# Patient Record
Sex: FEMALE | Race: BLACK OR AFRICAN AMERICAN | Employment: UNEMPLOYED | URBAN - METROPOLITAN AREA
[De-identification: names, ages, dates, MRNs, and addresses within clinical notes are randomized per-mention and may not be internally consistent; named-entity substitution may affect disease eponyms.]

---

## 2023-10-31 ENCOUNTER — HOSPITAL ENCOUNTER (EMERGENCY)
Facility: CLINIC | Age: 48
Discharge: HOME | End: 2023-10-31
Attending: EMERGENCY MEDICINE
Payer: COMMERCIAL

## 2023-10-31 VITALS
SYSTOLIC BLOOD PRESSURE: 152 MMHG | HEART RATE: 104 BPM | DIASTOLIC BLOOD PRESSURE: 80 MMHG | WEIGHT: 270 LBS | BODY MASS INDEX: 46.1 KG/M2 | RESPIRATION RATE: 18 BRPM | OXYGEN SATURATION: 99 % | HEIGHT: 64 IN | TEMPERATURE: 98.9 F

## 2023-10-31 DIAGNOSIS — H66.90 ACUTE OTITIS MEDIA, UNSPECIFIED OTITIS MEDIA TYPE: Primary | ICD-10-CM

## 2023-10-31 DIAGNOSIS — J06.9 UPPER RESPIRATORY TRACT INFECTION, UNSPECIFIED TYPE: ICD-10-CM

## 2023-10-31 PROCEDURE — 99283 EMERGENCY DEPT VISIT LOW MDM: CPT

## 2023-10-31 RX ORDER — AZITHROMYCIN 250 MG/1
TABLET, FILM COATED ORAL
Qty: 1 PACKET | Refills: 0 | Status: SHIPPED | OUTPATIENT
Start: 2023-10-31 | End: 2023-11-10

## 2023-10-31 RX ORDER — AZITHROMYCIN 250 MG/1
TABLET, FILM COATED ORAL
Qty: 1 PACKET | Refills: 0 | Status: SHIPPED | OUTPATIENT
Start: 2023-10-31 | End: 2023-10-31 | Stop reason: SDUPTHER

## 2023-10-31 RX ORDER — HYDROCHLOROTHIAZIDE 25 MG/1
TABLET ORAL DAILY
COMMUNITY
Start: 2017-01-28

## 2023-10-31 RX ORDER — BLOOD-GLUCOSE METER
1 KIT MISCELLANEOUS 4 TIMES DAILY PRN
Qty: 1 EACH | Refills: 0 | Status: SHIPPED | OUTPATIENT
Start: 2023-10-31

## 2023-10-31 RX ORDER — ALBUTEROL SULFATE 2.5 MG/3ML
2.5 SOLUTION RESPIRATORY (INHALATION) EVERY 6 HOURS PRN
Qty: 120 EACH | Refills: 3 | Status: SHIPPED | OUTPATIENT
Start: 2023-10-31

## 2023-10-31 RX ORDER — BENZONATATE 100 MG/1
200 CAPSULE ORAL 3 TIMES DAILY PRN
Qty: 30 CAPSULE | Refills: 0 | Status: SHIPPED | OUTPATIENT
Start: 2023-10-31 | End: 2023-11-07

## 2023-10-31 RX ORDER — ALBUTEROL SULFATE 90 UG/1
2 AEROSOL, METERED RESPIRATORY (INHALATION) 4 TIMES DAILY PRN
Qty: 18 G | Refills: 0 | Status: SHIPPED | OUTPATIENT
Start: 2023-10-31

## 2023-10-31 RX ORDER — AMLODIPINE BESYLATE 5 MG/1
1 TABLET ORAL DAILY
COMMUNITY
Start: 2017-01-28

## 2023-10-31 ASSESSMENT — ENCOUNTER SYMPTOMS
RHINORRHEA: 1
COUGH: 0
DIARRHEA: 0
VOMITING: 0
SHORTNESS OF BREATH: 0
EYE PAIN: 0
NAUSEA: 0
SORE THROAT: 1
ABDOMINAL PAIN: 0
BACK PAIN: 0

## 2023-10-31 ASSESSMENT — PAIN SCALES - GENERAL: PAINLEVEL_OUTOF10: 5

## 2023-10-31 ASSESSMENT — PAIN DESCRIPTION - LOCATION: LOCATION: THROAT

## 2023-10-31 NOTE — DISCHARGE INSTRUCTIONS
PLEASE RETURN TO THE EMERGENCY DEPARTMENT IMMEDIATELY if your symptoms worsen in anyway or in 1-2 days if not improved for re-evaluation. You should immediately return to the ER for symptoms such as new or worsening pain, difficulty breathing or swallowing, a change in your voice, a feeling of passing out, light headed, dizziness, chest pain, headache, neck pain, rash, abdominal pain or vomiting. Take your medication as indicated and prescribed. If you are given an antibiotic then, make sure you get the prescription filled and take the antibiotics until finished. Please understand that at this time there is no evidence for a more serious underlying process, but that early in the process of an illness or injury, an emergency department workup can be falsely reassuring. You should contact your family doctor within the next 48 hours for a follow up appointment. [address]!!!    From [facility] [place] ([facility]) and [address] [place] [address] Emergency Services    On behalf of the Emergency Department staff at [facility]), I would like to thank you for giving us the opportunity to address your health care needs and concerns. We hope that during your visit, our service was delivered in a professional and caring manner. Please keep [facility]) in mind as we walk with you down the path to your own personal wellness. Please expect an automated text message or email from us so we can ask a few questions about your health and progress. Based on your answers, a clinician may call you back to offer help and instructions. Please understand that early in the process of an illness or injury, an emergency department workup can be falsely reassuring. If you notice any worsening, changing or persistent symptoms please call your family doctor or return to the ER immediately. Tell us how we did during your visit at [email or web address]   and let us know about your experience

## 2023-10-31 NOTE — ED PROVIDER NOTES
[facility]  [address]  Phone: [phone] Name: Armida Conner  MRN: [id number]  [address] [facility] 1975  Date of evaluation: 10/31/2023      CHIEF COMPLAINT       Chief Complaint   Patient presents with    URI     States granddaughter has strep and she is not feeling well, cough, sorethroat       HISTORY OF PRESENT ILLNESS       Armida Conner is a 57 y.o. female who presents with upper respiratory infectious symptoms for the past 4 to 5 days. Her grandchildren have strep pharyngitis. Nothing otherwise makes it better or worse. No difficulty breathing. She does report some mild otalgia and pharyngitis. Denies any fevers. Other symptoms or concerns. REVIEW OF SYSTEMS       Review of Systems   Constitutional:  Negative for chills, fatigue and fever. HENT:  Positive for congestion, ear pain, rhinorrhea and sore throat. Negative for ear discharge. Eyes:  Negative for pain. Respiratory:  Negative for cough and shortness of breath. Cardiovascular:  Negative for chest pain. Gastrointestinal:  Negative for abdominal pain, diarrhea, nausea and vomiting. Genitourinary:  Negative for difficulty urinating. Musculoskeletal:  Negative for back pain and neck pain. Skin:  Negative for rash. Neurological:  Negative for weakness and headaches. PAST MEDICAL HISTORY    has no past medical history on file. SURGICAL HISTORY      has no past surgical history on file. CURRENT MEDICATIONS       Previous Medications    AMLODIPINE (NORVASC) 5 MG TABLET    Take 1 tablet by mouth daily    HYDROCHLOROTHIAZIDE (HYDRODIURIL) 25 MG TABLET    Take by mouth daily       ALLERGIES     is allergic to antihistamines, chlorpheniramine-type. FAMILY HISTORY     has no family status information on file. family history is not on file. SOCIAL HISTORY      reports that she has never smoked.  She has never used smokeless

## 2025-02-26 ENCOUNTER — HOSPITAL ENCOUNTER (INPATIENT)
Age: 50
LOS: 2 days | Discharge: HOME | DRG: 639 | End: 2025-02-28
Attending: EMERGENCY MEDICINE | Admitting: INTERNAL MEDICINE
Payer: COMMERCIAL

## 2025-02-26 ENCOUNTER — APPOINTMENT (OUTPATIENT)
Dept: CT IMAGING | Age: 50
DRG: 639 | End: 2025-02-26
Payer: COMMERCIAL

## 2025-02-26 DIAGNOSIS — J11.1 INFLUENZA WITH RESPIRATORY MANIFESTATION OTHER THAN PNEUMONIA: ICD-10-CM

## 2025-02-26 DIAGNOSIS — E11.10 DIABETIC KETOACIDOSIS WITHOUT COMA ASSOCIATED WITH TYPE 2 DIABETES MELLITUS (HCC): Primary | ICD-10-CM

## 2025-02-26 DIAGNOSIS — R74.01 TRANSAMINITIS: ICD-10-CM

## 2025-02-26 PROBLEM — J10.1 INFLUENZA A: Status: ACTIVE | Noted: 2025-02-26

## 2025-02-26 PROBLEM — J30.9 ALLERGIC RHINITIS: Status: ACTIVE | Noted: 2025-02-26

## 2025-02-26 PROBLEM — I10 PRIMARY HYPERTENSION: Status: ACTIVE | Noted: 2025-02-26

## 2025-02-26 LAB
ALBUMIN SERPL-MCNC: 3.8 G/DL (ref 3.5–5.2)
ALBUMIN SERPL-MCNC: 4.1 G/DL (ref 3.5–5.2)
ALBUMIN/GLOB SERPL: 1.1 {RATIO} (ref 1–2.5)
ALBUMIN/GLOB SERPL: 1.2 {RATIO} (ref 1–2.5)
ALP SERPL-CCNC: 115 U/L (ref 35–104)
ALP SERPL-CCNC: 122 U/L (ref 35–104)
ALT SERPL-CCNC: 76 U/L (ref 10–35)
ALT SERPL-CCNC: 79 U/L (ref 10–35)
ANION GAP SERPL CALCULATED.3IONS-SCNC: 19 MMOL/L (ref 9–16)
ANION GAP SERPL CALCULATED.3IONS-SCNC: 21 MMOL/L (ref 9–16)
ANION GAP SERPL CALCULATED.3IONS-SCNC: 24 MMOL/L (ref 9–16)
ANION GAP SERPL CALCULATED.3IONS-SCNC: 26 MMOL/L (ref 9–16)
AST SERPL-CCNC: 51 U/L (ref 10–35)
AST SERPL-CCNC: 58 U/L (ref 10–35)
B-OH-BUTYR SERPL-MCNC: 4.9 MMOL/L (ref 0.02–0.27)
B-OH-BUTYR SERPL-MCNC: 6.96 MMOL/L (ref 0.02–0.27)
BASOPHILS # BLD: 0.05 K/UL (ref 0–0.2)
BASOPHILS NFR BLD: 1 % (ref 0–2)
BILIRUB SERPL-MCNC: 0.5 MG/DL (ref 0–1.2)
BILIRUB SERPL-MCNC: 0.6 MG/DL (ref 0–1.2)
BILIRUB UR QL STRIP: ABNORMAL
BUN SERPL-MCNC: 10 MG/DL (ref 6–20)
BUN SERPL-MCNC: 7 MG/DL (ref 6–20)
BUN SERPL-MCNC: 8 MG/DL (ref 6–20)
BUN SERPL-MCNC: 8 MG/DL (ref 6–20)
CALCIUM SERPL-MCNC: 8.4 MG/DL (ref 8.6–10.4)
CALCIUM SERPL-MCNC: 9 MG/DL (ref 8.6–10.4)
CALCIUM SERPL-MCNC: 9 MG/DL (ref 8.6–10.4)
CALCIUM SERPL-MCNC: 9.4 MG/DL (ref 8.6–10.4)
CASTS #/AREA URNS LPF: ABNORMAL /LPF
CASTS #/AREA URNS LPF: ABNORMAL /LPF
CHLORIDE SERPL-SCNC: 90 MMOL/L (ref 98–107)
CHLORIDE SERPL-SCNC: 93 MMOL/L (ref 98–107)
CHLORIDE SERPL-SCNC: 96 MMOL/L (ref 98–107)
CHLORIDE SERPL-SCNC: 97 MMOL/L (ref 98–107)
CLARITY UR: CLEAR
CO2 SERPL-SCNC: 15 MMOL/L (ref 20–31)
CO2 SERPL-SCNC: 15 MMOL/L (ref 20–31)
CO2 SERPL-SCNC: 17 MMOL/L (ref 20–31)
CO2 SERPL-SCNC: 19 MMOL/L (ref 20–31)
COLOR UR: YELLOW
CREAT SERPL-MCNC: 0.7 MG/DL (ref 0.5–0.9)
CREAT SERPL-MCNC: 0.8 MG/DL (ref 0.5–0.9)
CREAT SERPL-MCNC: 0.9 MG/DL (ref 0.5–0.9)
CREAT SERPL-MCNC: 1 MG/DL (ref 0.5–0.9)
EOSINOPHIL # BLD: 0.06 K/UL (ref 0–0.44)
EOSINOPHILS RELATIVE PERCENT: 1 % (ref 1–4)
EPI CELLS #/AREA URNS HPF: ABNORMAL /HPF (ref 0–5)
ERYTHROCYTE [DISTWIDTH] IN BLOOD BY AUTOMATED COUNT: 13.8 % (ref 11.8–14.4)
EST. AVERAGE GLUCOSE BLD GHB EST-MCNC: 392 MG/DL
FLUAV RNA RESP QL NAA+PROBE: DETECTED
FLUBV RNA RESP QL NAA+PROBE: NOT DETECTED
GFR, ESTIMATED: 69 ML/MIN/1.73M2
GFR, ESTIMATED: 76 ML/MIN/1.73M2
GFR, ESTIMATED: 89 ML/MIN/1.73M2
GFR, ESTIMATED: >90 ML/MIN/1.73M2
GLUCOSE BLD-MCNC: 135 MG/DL (ref 65–105)
GLUCOSE BLD-MCNC: 141 MG/DL (ref 65–105)
GLUCOSE BLD-MCNC: 167 MG/DL (ref 65–105)
GLUCOSE BLD-MCNC: 175 MG/DL (ref 65–105)
GLUCOSE BLD-MCNC: 212 MG/DL (ref 65–105)
GLUCOSE BLD-MCNC: 247 MG/DL (ref 65–105)
GLUCOSE BLD-MCNC: 284 MG/DL (ref 65–105)
GLUCOSE SERPL-MCNC: 137 MG/DL (ref 74–99)
GLUCOSE SERPL-MCNC: 185 MG/DL (ref 74–99)
GLUCOSE SERPL-MCNC: 301 MG/DL (ref 74–99)
GLUCOSE SERPL-MCNC: 345 MG/DL (ref 74–99)
GLUCOSE UR STRIP-MCNC: ABNORMAL MG/DL
HBA1C MFR BLD: 15.3 % (ref 4–6)
HCG SERPL QL: NEGATIVE
HCO3 VENOUS: 19 MMOL/L (ref 22–29)
HCT VFR BLD AUTO: 44.4 % (ref 36.3–47.1)
HGB BLD-MCNC: 14.8 G/DL (ref 11.9–15.1)
HGB UR QL STRIP.AUTO: ABNORMAL
IMM GRANULOCYTES # BLD AUTO: 0.03 K/UL (ref 0–0.3)
IMM GRANULOCYTES NFR BLD: 0 %
KETONES UR STRIP-MCNC: ABNORMAL MG/DL
LACTATE BLDV-SCNC: 1.3 MMOL/L (ref 0.5–1.9)
LEUKOCYTE ESTERASE UR QL STRIP: NEGATIVE
LIPASE SERPL-CCNC: 50 U/L (ref 13–60)
LYMPHOCYTES NFR BLD: 2.04 K/UL (ref 1.1–3.7)
LYMPHOCYTES RELATIVE PERCENT: 21 % (ref 24–43)
MAGNESIUM SERPL-MCNC: 1.6 MG/DL (ref 1.6–2.6)
MAGNESIUM SERPL-MCNC: 1.7 MG/DL (ref 1.6–2.6)
MAGNESIUM SERPL-MCNC: 1.8 MG/DL (ref 1.6–2.6)
MAGNESIUM SERPL-MCNC: 1.8 MG/DL (ref 1.6–2.6)
MCH RBC QN AUTO: 27.9 PG (ref 25.2–33.5)
MCHC RBC AUTO-ENTMCNC: 33.3 G/DL (ref 28.4–34.8)
MCV RBC AUTO: 83.8 FL (ref 82.6–102.9)
MONOCYTES NFR BLD: 0.78 K/UL (ref 0.1–1.2)
MONOCYTES NFR BLD: 8 % (ref 3–12)
MUCOUS THREADS URNS QL MICRO: ABNORMAL
NEGATIVE BASE EXCESS, VEN: 5.3 MMOL/L (ref 0–2)
NEUTROPHILS NFR BLD: 69 % (ref 36–65)
NEUTS SEG NFR BLD: 6.72 K/UL (ref 1.5–8.1)
NITRITE UR QL STRIP: NEGATIVE
NRBC BLD-RTO: 0 PER 100 WBC
O2 SAT, VEN: 98.2 % (ref 60–85)
PCO2 VENOUS: 33.1 MM HG (ref 41–51)
PH UR STRIP: 6 [PH] (ref 5–8)
PH VENOUS: 7.37 (ref 7.32–7.43)
PHOSPHATE SERPL-MCNC: 2.2 MG/DL (ref 2.5–4.5)
PHOSPHATE SERPL-MCNC: 3.1 MG/DL (ref 2.5–4.5)
PLATELET # BLD AUTO: 157 K/UL (ref 138–453)
PMV BLD AUTO: 14.2 FL (ref 8.1–13.5)
PO2 VENOUS: 111.1 MM HG (ref 30–50)
POTASSIUM SERPL-SCNC: 3 MMOL/L (ref 3.7–5.3)
POTASSIUM SERPL-SCNC: 3.1 MMOL/L (ref 3.7–5.3)
PROT SERPL-MCNC: 7.2 G/DL (ref 6.6–8.7)
PROT SERPL-MCNC: 7.5 G/DL (ref 6.6–8.7)
PROT UR STRIP-MCNC: ABNORMAL MG/DL
RBC # BLD AUTO: 5.3 M/UL (ref 3.95–5.11)
RBC #/AREA URNS HPF: ABNORMAL /HPF (ref 0–2)
SARS-COV-2 RNA RESP QL NAA+PROBE: NOT DETECTED
SODIUM SERPL-SCNC: 131 MMOL/L (ref 136–145)
SODIUM SERPL-SCNC: 132 MMOL/L (ref 136–145)
SODIUM SERPL-SCNC: 134 MMOL/L (ref 136–145)
SODIUM SERPL-SCNC: 135 MMOL/L (ref 136–145)
SOURCE: ABNORMAL
SP GR UR STRIP: 1.02 (ref 1–1.03)
SPECIMEN DESCRIPTION: ABNORMAL
UROBILINOGEN UR STRIP-ACNC: NORMAL EU/DL (ref 0–1)
WBC #/AREA URNS HPF: ABNORMAL /HPF (ref 0–5)
WBC OTHER # BLD: 9.7 K/UL (ref 3.5–11.3)

## 2025-02-26 PROCEDURE — 2000000000 HC ICU R&B

## 2025-02-26 PROCEDURE — 83036 HEMOGLOBIN GLYCOSYLATED A1C: CPT

## 2025-02-26 PROCEDURE — 6360000002 HC RX W HCPCS

## 2025-02-26 PROCEDURE — 85025 COMPLETE CBC W/AUTO DIFF WBC: CPT

## 2025-02-26 PROCEDURE — 2500000003 HC RX 250 WO HCPCS

## 2025-02-26 PROCEDURE — 96365 THER/PROPH/DIAG IV INF INIT: CPT

## 2025-02-26 PROCEDURE — 99223 1ST HOSP IP/OBS HIGH 75: CPT

## 2025-02-26 PROCEDURE — 2580000003 HC RX 258: Performed by: PHYSICIAN ASSISTANT

## 2025-02-26 PROCEDURE — 80053 COMPREHEN METABOLIC PANEL: CPT

## 2025-02-26 PROCEDURE — 83735 ASSAY OF MAGNESIUM: CPT

## 2025-02-26 PROCEDURE — 99285 EMERGENCY DEPT VISIT HI MDM: CPT

## 2025-02-26 PROCEDURE — 74177 CT ABD & PELVIS W/CONTRAST: CPT

## 2025-02-26 PROCEDURE — 83690 ASSAY OF LIPASE: CPT

## 2025-02-26 PROCEDURE — 82010 KETONE BODYS QUAN: CPT

## 2025-02-26 PROCEDURE — 6360000002 HC RX W HCPCS: Performed by: PHYSICIAN ASSISTANT

## 2025-02-26 PROCEDURE — 87636 SARSCOV2 & INF A&B AMP PRB: CPT

## 2025-02-26 PROCEDURE — 6360000004 HC RX CONTRAST MEDICATION: Performed by: EMERGENCY MEDICINE

## 2025-02-26 PROCEDURE — 84100 ASSAY OF PHOSPHORUS: CPT

## 2025-02-26 PROCEDURE — 83605 ASSAY OF LACTIC ACID: CPT

## 2025-02-26 PROCEDURE — 36415 COLL VENOUS BLD VENIPUNCTURE: CPT

## 2025-02-26 PROCEDURE — 82947 ASSAY GLUCOSE BLOOD QUANT: CPT

## 2025-02-26 PROCEDURE — 6370000000 HC RX 637 (ALT 250 FOR IP)

## 2025-02-26 PROCEDURE — 84703 CHORIONIC GONADOTROPIN ASSAY: CPT

## 2025-02-26 PROCEDURE — 81001 URINALYSIS AUTO W/SCOPE: CPT

## 2025-02-26 PROCEDURE — 80048 BASIC METABOLIC PNL TOTAL CA: CPT

## 2025-02-26 PROCEDURE — 2580000003 HC RX 258

## 2025-02-26 PROCEDURE — 82803 BLOOD GASES ANY COMBINATION: CPT

## 2025-02-26 PROCEDURE — 2500000003 HC RX 250 WO HCPCS: Performed by: EMERGENCY MEDICINE

## 2025-02-26 RX ORDER — IOPAMIDOL 755 MG/ML
75 INJECTION, SOLUTION INTRAVASCULAR
Status: COMPLETED | OUTPATIENT
Start: 2025-02-26 | End: 2025-02-26

## 2025-02-26 RX ORDER — SODIUM CHLORIDE 450 MG/100ML
INJECTION, SOLUTION INTRAVENOUS CONTINUOUS
Status: DISCONTINUED | OUTPATIENT
Start: 2025-02-26 | End: 2025-02-26

## 2025-02-26 RX ORDER — SODIUM CHLORIDE 0.9 % (FLUSH) 0.9 %
10 SYRINGE (ML) INJECTION ONCE
Status: COMPLETED | OUTPATIENT
Start: 2025-02-26 | End: 2025-02-26

## 2025-02-26 RX ORDER — POTASSIUM CHLORIDE 7.45 MG/ML
10 INJECTION INTRAVENOUS PRN
Status: DISCONTINUED | OUTPATIENT
Start: 2025-02-26 | End: 2025-02-28 | Stop reason: HOSPADM

## 2025-02-26 RX ORDER — SODIUM CHLORIDE 9 MG/ML
INJECTION, SOLUTION INTRAVENOUS CONTINUOUS
Status: DISCONTINUED | OUTPATIENT
Start: 2025-02-26 | End: 2025-02-26

## 2025-02-26 RX ORDER — INSULIN GLARGINE 100 [IU]/ML
15 INJECTION, SOLUTION SUBCUTANEOUS NIGHTLY
Status: ON HOLD | COMMUNITY
End: 2025-02-28

## 2025-02-26 RX ORDER — ALBUTEROL SULFATE 90 UG/1
2 INHALANT RESPIRATORY (INHALATION) 4 TIMES DAILY PRN
Status: DISCONTINUED | OUTPATIENT
Start: 2025-02-26 | End: 2025-02-26

## 2025-02-26 RX ORDER — 0.9 % SODIUM CHLORIDE 0.9 %
80 INTRAVENOUS SOLUTION INTRAVENOUS ONCE
Status: DISCONTINUED | OUTPATIENT
Start: 2025-02-26 | End: 2025-02-28 | Stop reason: HOSPADM

## 2025-02-26 RX ORDER — IBUPROFEN 800 MG/1
800 TABLET, FILM COATED ORAL PRN
Status: ON HOLD | COMMUNITY
End: 2025-02-28 | Stop reason: HOSPADM

## 2025-02-26 RX ORDER — POTASSIUM CHLORIDE 7.45 MG/ML
10 INJECTION INTRAVENOUS
Status: DISPENSED | OUTPATIENT
Start: 2025-02-26 | End: 2025-02-26

## 2025-02-26 RX ORDER — POTASSIUM CHLORIDE 7.45 MG/ML
10 INJECTION INTRAVENOUS PRN
Status: DISCONTINUED | OUTPATIENT
Start: 2025-02-26 | End: 2025-02-26

## 2025-02-26 RX ORDER — DEXTROSE MONOHYDRATE AND SODIUM CHLORIDE 5; .45 G/100ML; G/100ML
INJECTION, SOLUTION INTRAVENOUS CONTINUOUS PRN
Status: DISCONTINUED | OUTPATIENT
Start: 2025-02-26 | End: 2025-02-28 | Stop reason: HOSPADM

## 2025-02-26 RX ORDER — MAGNESIUM SULFATE IN WATER 40 MG/ML
2000 INJECTION, SOLUTION INTRAVENOUS PRN
Status: DISCONTINUED | OUTPATIENT
Start: 2025-02-26 | End: 2025-02-26

## 2025-02-26 RX ORDER — ENOXAPARIN SODIUM 100 MG/ML
30 INJECTION SUBCUTANEOUS 2 TIMES DAILY
Status: DISCONTINUED | OUTPATIENT
Start: 2025-02-26 | End: 2025-02-28 | Stop reason: HOSPADM

## 2025-02-26 RX ORDER — MONTELUKAST SODIUM 10 MG/1
10 TABLET ORAL NIGHTLY
COMMUNITY

## 2025-02-26 RX ORDER — ALBUTEROL SULFATE 0.83 MG/ML
2.5 SOLUTION RESPIRATORY (INHALATION) EVERY 6 HOURS PRN
Status: DISCONTINUED | OUTPATIENT
Start: 2025-02-26 | End: 2025-02-26

## 2025-02-26 RX ORDER — POLYETHYLENE GLYCOL 3350 17 G/17G
17 POWDER, FOR SOLUTION ORAL DAILY PRN
Status: DISCONTINUED | OUTPATIENT
Start: 2025-02-26 | End: 2025-02-28 | Stop reason: HOSPADM

## 2025-02-26 RX ORDER — ALBUTEROL SULFATE 90 UG/1
2 INHALANT RESPIRATORY (INHALATION) EVERY 4 HOURS PRN
Status: DISCONTINUED | OUTPATIENT
Start: 2025-02-26 | End: 2025-02-28 | Stop reason: HOSPADM

## 2025-02-26 RX ORDER — MAGNESIUM SULFATE IN WATER 40 MG/ML
2000 INJECTION, SOLUTION INTRAVENOUS PRN
Status: DISCONTINUED | OUTPATIENT
Start: 2025-02-26 | End: 2025-02-28 | Stop reason: HOSPADM

## 2025-02-26 RX ORDER — FLUTICASONE PROPIONATE 50 MCG
1 SPRAY, SUSPENSION (ML) NASAL PRN
COMMUNITY

## 2025-02-26 RX ORDER — BUDESONIDE AND FORMOTEROL FUMARATE DIHYDRATE 80; 4.5 UG/1; UG/1
2 AEROSOL RESPIRATORY (INHALATION) 2 TIMES DAILY PRN
COMMUNITY

## 2025-02-26 RX ORDER — LORAZEPAM 2 MG/ML
0.5 INJECTION INTRAMUSCULAR ONCE
Status: DISCONTINUED | OUTPATIENT
Start: 2025-02-27 | End: 2025-02-28 | Stop reason: HOSPADM

## 2025-02-26 RX ORDER — DEXTROSE MONOHYDRATE, SODIUM CHLORIDE, AND POTASSIUM CHLORIDE 50; 1.49; 4.5 G/1000ML; G/1000ML; G/1000ML
INJECTION, SOLUTION INTRAVENOUS CONTINUOUS PRN
Status: DISCONTINUED | OUTPATIENT
Start: 2025-02-26 | End: 2025-02-26

## 2025-02-26 RX ORDER — DIPHENHYDRAMINE HYDROCHLORIDE 50 MG/ML
25 INJECTION INTRAMUSCULAR; INTRAVENOUS ONCE
Status: DISCONTINUED | OUTPATIENT
Start: 2025-02-26 | End: 2025-02-26

## 2025-02-26 RX ORDER — 0.9 % SODIUM CHLORIDE 0.9 %
1000 INTRAVENOUS SOLUTION INTRAVENOUS ONCE
Status: COMPLETED | OUTPATIENT
Start: 2025-02-26 | End: 2025-02-26

## 2025-02-26 RX ORDER — MONTELUKAST SODIUM 10 MG/1
10 TABLET ORAL NIGHTLY
Status: DISCONTINUED | OUTPATIENT
Start: 2025-02-26 | End: 2025-02-28 | Stop reason: HOSPADM

## 2025-02-26 RX ADMIN — SODIUM CHLORIDE 1000 ML: 0.9 INJECTION, SOLUTION INTRAVENOUS at 11:47

## 2025-02-26 RX ADMIN — Medication 80 ML: at 12:48

## 2025-02-26 RX ADMIN — IOPAMIDOL 75 ML: 755 INJECTION, SOLUTION INTRAVENOUS at 12:47

## 2025-02-26 RX ADMIN — SODIUM CHLORIDE 4.5 UNITS/HR: 9 INJECTION, SOLUTION INTRAVENOUS at 16:18

## 2025-02-26 RX ADMIN — POTASSIUM CHLORIDE 10 MEQ: 7.46 INJECTION, SOLUTION INTRAVENOUS at 23:08

## 2025-02-26 RX ADMIN — POTASSIUM CHLORIDE 10 MEQ: 7.46 INJECTION, SOLUTION INTRAVENOUS at 16:16

## 2025-02-26 RX ADMIN — DEXTROSE AND SODIUM CHLORIDE: 5; .45 INJECTION, SOLUTION INTRAVENOUS at 17:15

## 2025-02-26 RX ADMIN — MONTELUKAST 10 MG: 10 TABLET, FILM COATED ORAL at 19:24

## 2025-02-26 RX ADMIN — ENOXAPARIN SODIUM 30 MG: 100 INJECTION SUBCUTANEOUS at 22:05

## 2025-02-26 RX ADMIN — SODIUM CHLORIDE, PRESERVATIVE FREE 10 ML: 5 INJECTION INTRAVENOUS at 12:48

## 2025-02-26 RX ADMIN — SODIUM PHOSPHATE, MONOBASIC, MONOHYDRATE AND SODIUM PHOSPHATE, DIBASIC, ANHYDROUS 15 MMOL: 142; 276 INJECTION, SOLUTION INTRAVENOUS at 20:38

## 2025-02-26 RX ADMIN — SODIUM CHLORIDE: 4.5 INJECTION, SOLUTION INTRAVENOUS at 16:15

## 2025-02-26 RX ADMIN — POTASSIUM CHLORIDE 10 MEQ: 7.46 INJECTION, SOLUTION INTRAVENOUS at 20:37

## 2025-02-26 RX ADMIN — POTASSIUM CHLORIDE 10 MEQ: 7.46 INJECTION, SOLUTION INTRAVENOUS at 13:26

## 2025-02-26 RX ADMIN — DEXTROSE AND SODIUM CHLORIDE: 5; .45 INJECTION, SOLUTION INTRAVENOUS at 23:39

## 2025-02-26 RX ADMIN — POTASSIUM CHLORIDE 10 MEQ: 7.46 INJECTION, SOLUTION INTRAVENOUS at 14:30

## 2025-02-26 RX ADMIN — POTASSIUM CHLORIDE 10 MEQ: 7.46 INJECTION, SOLUTION INTRAVENOUS at 22:05

## 2025-02-26 RX ADMIN — POTASSIUM CHLORIDE 10 MEQ: 7.46 INJECTION, SOLUTION INTRAVENOUS at 17:17

## 2025-02-26 ASSESSMENT — ENCOUNTER SYMPTOMS
COUGH: 0
CONSTIPATION: 1
NAUSEA: 1
VOMITING: 1
ABDOMINAL PAIN: 0
ABDOMINAL PAIN: 1
SHORTNESS OF BREATH: 0
WHEEZING: 0
DIARRHEA: 0

## 2025-02-26 ASSESSMENT — PAIN SCALES - GENERAL: PAINLEVEL_OUTOF10: 0

## 2025-02-26 NOTE — PROGRESS NOTES
Pharmacy Medication Review    The patient's list of current home medications has been reviewed.     PHYSICIANS AND NURSE PRACTITIONERS: please note there is no Transitions of Care/Med Rec Pharmacist available to address any discrepancies on inpatient orders. It is the responsibility of the attending provider to review the updates made to the home med list and adjust inpatient orders as appropriate.        Source(s) of information:patient and [facility] pharmacy        Based on information provided by the above source(s), I have updated the patient's home med list as described below.     Removed   None      Added insulin glargine (LANTUS SOLOSTAR) 100 UNIT/ML inject pen   metFORMIN (GLUCOPHAGE) 500 MG tablet   montelukast (SINGULAIR) 10 MG tablet   ibuprofen (ADVIL;MOTRIN) 800 MG tablet   fluticasone (FLONASE ALLERGY RELIEF) 50 MCG/ACT nasal    Collagen-Vitamin C-Biotin (COLLAGEN PO)   budesonide-formoterol (SYMBICORT) 80-4.5 MCG/ACT AERO        Adjusted   hydroCHLOROthiazide (HYDRODIURIL) 25 MG tablet     Other notes:   Patient stated that she use her respiratory inhalers and nebulizer medication as needed last filling in 2023. Patient's lantus 100u/mL and metformin 500mg tablet dose was increased on 2/17/2025      Are any of the medications noted above considered a 'high alert' medication? YES      Is the patient on warfarin at home? No          Please feel free to call me with any questions about this encounter. Thank you.      Torsten Dyer, pharmacy technician  [facility]  Phone:  [phone]      Electronically signed by Torsten Dyer on 2/26/2025 at 4:47 PM   Note: co-signature by the pharmacist only acknowledges that I have performed a medication review and does not attest to an evaluation of this medication review.        Prior to Admission medications    Medication Sig   insulin glargine (LANTUS SOLOSTAR) 100 UNIT/ML injection pen Inject 15 Units into the skin nightly   metFORMIN

## 2025-02-26 NOTE — ED PROVIDER NOTES
EMERGENCY DEPARTMENT ENCOUNTER    Pt Name: Rosamaria Henry  MRN: [id number]  Birthdate 1975  Date of evaluation: 2/26/25  CHIEF COMPLAINT       Chief Complaint   Patient presents with    Hyperglycemia     Pt's meter shows >400, states she takes her insulin as prescribed    Headache     HISTORY OF PRESENT ILLNESS   Patient is a 49-year-old female who presents after referral from her PCPs office.  The patient was diagnosed with type 2 diabetes about 3 weeks ago.  At that time she was started on Ozempic (she is on her second dose), metformin recently increased to 1000 mg 2 times daily, and Lantus, recently increased to 15 units at night.  The patient is compliant with all her medications.  She states that initially her sugars were in the 700s, had been running anywhere between 205 500 at home however yesterday x 1 and today x 1 she got readings of high.  In addition she has been quite constipated, unable to have a bowel movement in about 5 days.  Has been taking over-the-counter laxatives and stool softeners.  Does not have a history of any bowel obstruction.  Does feel generalized malaise and generally unwell with nausea as well.  Yesterday she ate very little, had a cucumber last night, has not had anything to eat today because she is so afraid of her blood sugar going up             REVIEW OF SYSTEMS     Review of Systems   Respiratory:  Negative for shortness of breath.    Cardiovascular:  Negative for chest pain.   Gastrointestinal:  Positive for abdominal pain, constipation and nausea.   Neurological:  Positive for weakness and headaches.     PASTMEDICAL HISTORY     Past Medical History:   Diagnosis Date    Diabetes mellitus (HCC)     Hypertension      Past Problem List  Patient Active Problem List   Diagnosis Code    DKA, type 2, not at goal (HCC) E11.10     SURGICAL HISTORY       Past Surgical History:   Procedure Laterality Date    KNEE SURGERY       CURRENT MEDICATIONS       Previous Medications         1. Diabetic ketoacidosis without coma associated with type 2 diabetes mellitus (HCC)    2. Influenza with respiratory manifestation other than pneumonia          DISPOSITION/PLAN   DISPOSITION Admitted 02/26/2025 03:18:05 PM               OUTPATIENT FOLLOW UP THE PATIENT:  No follow-up provider specified.    Vielka Olvera PA-C        This note was created with the assistance of a speech-recognition program. While intending to generate a document that actually reflects the content of the visit, no guarantees can be provided that every mistake has been identified and corrected by editing.      Vielka Olvera PA-C  02/26/25 1612      eMERGENCY dEPARTMENT eNCOUnter   Independent Attestation     Pt Name: Rosamaria Henry  MRN: [id number]  Birthdate 1975  Date of evaluation: 2/26/25     Rosamaria Henry is a 49 y.o. female with CC: Hyperglycemia (Pt's meter shows >400, states she takes her insulin as prescribed) and Headache      Based on the medical record the care appears appropriate.  I was personally available for consultation in the Emergency Department.    Mayur Dawkins MD  Attending Emergency Physician                 Mayur Dawkins MD  02/26/25 [id number]

## 2025-02-26 NOTE — H&P
49-year-old female was admitted for management of DKA secondary to uncontrolled type 2 diabetes mellitus.  Patient states that she was recently diagnosed with type 2 diabetes approximately 3 weeks ago, and has been compliant with her Ozempic, metformin, and Lantus at night.  She has had increases in her Lantus and metformin doses, which she states she has been compliant and has not missed any doses.  She states that she has not felt well since her diagnosis 3 weeks ago, and has noticed increased nausea, vomiting, and decreased appetite over the past 4-5 days.  She also notes a fruity/sour taste in her mouth.  She states her glucometer at home was reading over 400, and she was trying to avoid eating with the hopes of improving her blood sugar.  She reports associated dizziness, lightheadedness, and generalized weakness and fatigue.  She was incidentally found to be influenza A positive, however the patient denies any shortness of breath, cough, fevers, chills.  No other acute concerns at this time.  Multiple questions answered.    Past Medical History:     Past Medical History:   Diagnosis Date   • Diabetes mellitus (HCC)    • Hypertension         Past Surgical History:     Past Surgical History:   Procedure Laterality Date   • KNEE SURGERY          Medications Prior to Admission:     Prior to Admission medications    Medication Sig Start Date End Date Taking? Authorizing Provider   insulin glargine (LANTUS SOLOSTAR) 100 UNIT/ML injection pen Inject 15 Units into the skin nightly   Yes Irene Nava MD   metFORMIN (GLUCOPHAGE) 500 MG tablet Take 2 tablets by mouth 2 times daily (with meals)   Yes Irene Nava MD   montelukast (SINGULAIR) 10 MG tablet Take 1 tablet by mouth nightly   Yes Irene Nava MD   ibuprofen (ADVIL;MOTRIN) 800 MG tablet Take 1 tablet by mouth as needed for Pain   Yes Irene Nava MD   fluticasone (FLONASE ALLERGY RELIEF) 50 MCG/ACT nasal spray 1 spray by Each  Nostril route as needed for Rhinitis   Yes ProviderIrene MD   Collagen-Vitamin C-Biotin (COLLAGEN PO) Take 3 capsules by mouth daily   Yes Irene Nava MD   budesonide-formoterol (SYMBICORT) 80-4.5 MCG/ACT AERO Inhale 2 puffs into the lungs 2 times daily as needed   Yes Irene Nava MD   amLODIPine (NORVASC) 5 MG tablet Take 1 tablet by mouth daily 1/28/17   Irene Nava MD   hydroCHLOROthiazide (HYDRODIURIL) 25 MG tablet Take 1 tablet by mouth daily 1/28/17   Irene Nava MD   albuterol sulfate HFA (VENTOLIN HFA) 108 (90 Base) MCG/ACT inhaler Inhale 2 puffs into the lungs 4 times daily as needed for Wheezing 10/31/23   Reginald Moore DO   albuterol (PROVENTIL) (2.5 MG/3ML) 0.083% nebulizer solution Take 3 mLs by nebulization every 6 hours as needed for Wheezing 10/31/23   Reginald Moore DO   Nebulizer System All-In-One MISC 1 Units by Does not apply route 4 times daily as needed (dyspnea.  cough) 10/31/23   Reginald Moore, DO        Allergies:     Antihistamines, chlorpheniramine-type    Social History:     Tobacco:    reports that she has been smoking cigarettes. She has never used smokeless tobacco.  Alcohol:      reports that she does not currently use alcohol.  Drug Use:  reports no history of drug use.    Family History:     History reviewed. No pertinent family history.    Review of Systems:     Positive and Negative as described in HPI.    Review of Systems   Constitutional:  Positive for appetite change and fatigue. Negative for chills, diaphoresis and fever.   Respiratory:  Negative for cough, shortness of breath and wheezing.    Cardiovascular:  Negative for chest pain and palpitations.   Gastrointestinal:  Positive for constipation, nausea and vomiting. Negative for abdominal pain and diarrhea.   Genitourinary:  Negative for dysuria, frequency and urgency.   Neurological:  Positive for dizziness, weakness and light-headedness. Negative for

## 2025-02-26 NOTE — ED NOTES
Pt presenting to the ED with complaints of hyperglycemia as well as headache. Pt reports that she has been having these symptoms off and on for the last few days, and that she also recently was diagnosed as type 2 diabetic. Pt reports that she was intermittent fasting around 3 months ago and started having increased urinary frequency.

## 2025-02-27 LAB
ALBUMIN SERPL-MCNC: 3.6 G/DL (ref 3.5–5.2)
ALBUMIN/GLOB SERPL: 1.3 {RATIO} (ref 1–2.5)
ALP SERPL-CCNC: 98 U/L (ref 35–104)
ALT SERPL-CCNC: 66 U/L (ref 10–35)
ANION GAP SERPL CALCULATED.3IONS-SCNC: 14 MMOL/L (ref 9–16)
ANION GAP SERPL CALCULATED.3IONS-SCNC: 14 MMOL/L (ref 9–16)
ANION GAP SERPL CALCULATED.3IONS-SCNC: 15 MMOL/L (ref 9–16)
ANION GAP SERPL CALCULATED.3IONS-SCNC: 15 MMOL/L (ref 9–16)
AST SERPL-CCNC: 45 U/L (ref 10–35)
BILIRUB DIRECT SERPL-MCNC: 0.2 MG/DL (ref 0–0.2)
BILIRUB INDIRECT SERPL-MCNC: 0.2 MG/DL
BILIRUB SERPL-MCNC: 0.5 MG/DL (ref 0–1.2)
BUN SERPL-MCNC: 4 MG/DL (ref 6–20)
BUN SERPL-MCNC: 4 MG/DL (ref 6–20)
BUN SERPL-MCNC: 5 MG/DL (ref 6–20)
BUN SERPL-MCNC: 6 MG/DL (ref 6–20)
CALCIUM SERPL-MCNC: 8.3 MG/DL (ref 8.6–10.4)
CALCIUM SERPL-MCNC: 8.4 MG/DL (ref 8.6–10.4)
CALCIUM SERPL-MCNC: 8.4 MG/DL (ref 8.6–10.4)
CALCIUM SERPL-MCNC: 9 MG/DL (ref 8.6–10.4)
CHLORIDE SERPL-SCNC: 101 MMOL/L (ref 98–107)
CHLORIDE SERPL-SCNC: 102 MMOL/L (ref 98–107)
CHLORIDE SERPL-SCNC: 99 MMOL/L (ref 98–107)
CHLORIDE SERPL-SCNC: 99 MMOL/L (ref 98–107)
CO2 SERPL-SCNC: 21 MMOL/L (ref 20–31)
CO2 SERPL-SCNC: 21 MMOL/L (ref 20–31)
CO2 SERPL-SCNC: 22 MMOL/L (ref 20–31)
CO2 SERPL-SCNC: 23 MMOL/L (ref 20–31)
CREAT SERPL-MCNC: 0.7 MG/DL (ref 0.5–0.9)
CREAT SERPL-MCNC: 0.8 MG/DL (ref 0.5–0.9)
GFR, ESTIMATED: >90 ML/MIN/1.73M2
GLUCOSE BLD-MCNC: 129 MG/DL (ref 65–105)
GLUCOSE BLD-MCNC: 145 MG/DL (ref 65–105)
GLUCOSE BLD-MCNC: 165 MG/DL (ref 65–105)
GLUCOSE BLD-MCNC: 165 MG/DL (ref 65–105)
GLUCOSE BLD-MCNC: 180 MG/DL (ref 65–105)
GLUCOSE BLD-MCNC: 203 MG/DL (ref 65–105)
GLUCOSE BLD-MCNC: 203 MG/DL (ref 65–105)
GLUCOSE BLD-MCNC: 204 MG/DL (ref 65–105)
GLUCOSE BLD-MCNC: 207 MG/DL (ref 65–105)
GLUCOSE BLD-MCNC: 245 MG/DL (ref 65–105)
GLUCOSE BLD-MCNC: 303 MG/DL (ref 65–105)
GLUCOSE BLD-MCNC: 308 MG/DL (ref 65–105)
GLUCOSE SERPL-MCNC: 141 MG/DL (ref 74–99)
GLUCOSE SERPL-MCNC: 206 MG/DL (ref 74–99)
GLUCOSE SERPL-MCNC: 224 MG/DL (ref 74–99)
GLUCOSE SERPL-MCNC: 299 MG/DL (ref 74–99)
MAGNESIUM SERPL-MCNC: 1.6 MG/DL (ref 1.6–2.6)
MAGNESIUM SERPL-MCNC: 1.6 MG/DL (ref 1.6–2.6)
MAGNESIUM SERPL-MCNC: 2.1 MG/DL (ref 1.6–2.6)
MAGNESIUM SERPL-MCNC: 2.2 MG/DL (ref 1.6–2.6)
PHOSPHATE SERPL-MCNC: 1.7 MG/DL (ref 2.5–4.5)
PHOSPHATE SERPL-MCNC: 2.2 MG/DL (ref 2.5–4.5)
PHOSPHATE SERPL-MCNC: 2.2 MG/DL (ref 2.5–4.5)
PHOSPHATE SERPL-MCNC: 2.3 MG/DL (ref 2.5–4.5)
POTASSIUM SERPL-SCNC: 2.7 MMOL/L (ref 3.7–5.3)
POTASSIUM SERPL-SCNC: 3.1 MMOL/L (ref 3.7–5.3)
POTASSIUM SERPL-SCNC: 3.1 MMOL/L (ref 3.7–5.3)
POTASSIUM SERPL-SCNC: 3.4 MMOL/L (ref 3.7–5.3)
POTASSIUM SERPL-SCNC: 3.4 MMOL/L (ref 3.7–5.3)
PROT SERPL-MCNC: 6.3 G/DL (ref 6.6–8.7)
SODIUM SERPL-SCNC: 135 MMOL/L (ref 136–145)
SODIUM SERPL-SCNC: 135 MMOL/L (ref 136–145)
SODIUM SERPL-SCNC: 136 MMOL/L (ref 136–145)
SODIUM SERPL-SCNC: 138 MMOL/L (ref 136–145)

## 2025-02-27 PROCEDURE — 84100 ASSAY OF PHOSPHORUS: CPT

## 2025-02-27 PROCEDURE — 2500000003 HC RX 250 WO HCPCS

## 2025-02-27 PROCEDURE — 80076 HEPATIC FUNCTION PANEL: CPT

## 2025-02-27 PROCEDURE — 83735 ASSAY OF MAGNESIUM: CPT

## 2025-02-27 PROCEDURE — 80048 BASIC METABOLIC PNL TOTAL CA: CPT

## 2025-02-27 PROCEDURE — 36415 COLL VENOUS BLD VENIPUNCTURE: CPT

## 2025-02-27 PROCEDURE — 6370000000 HC RX 637 (ALT 250 FOR IP): Performed by: INTERNAL MEDICINE

## 2025-02-27 PROCEDURE — 6370000000 HC RX 637 (ALT 250 FOR IP): Performed by: NURSE PRACTITIONER

## 2025-02-27 PROCEDURE — 82947 ASSAY GLUCOSE BLOOD QUANT: CPT

## 2025-02-27 PROCEDURE — 6360000002 HC RX W HCPCS

## 2025-02-27 PROCEDURE — 2000000000 HC ICU R&B

## 2025-02-27 PROCEDURE — 2580000003 HC RX 258

## 2025-02-27 PROCEDURE — 84132 ASSAY OF SERUM POTASSIUM: CPT

## 2025-02-27 PROCEDURE — 6370000000 HC RX 637 (ALT 250 FOR IP)

## 2025-02-27 PROCEDURE — 99232 SBSQ HOSP IP/OBS MODERATE 35: CPT | Performed by: INTERNAL MEDICINE

## 2025-02-27 PROCEDURE — 94761 N-INVAS EAR/PLS OXIMETRY MLT: CPT

## 2025-02-27 RX ORDER — INSULIN LISPRO 100 [IU]/ML
0-8 INJECTION, SOLUTION INTRAVENOUS; SUBCUTANEOUS
Status: DISCONTINUED | OUTPATIENT
Start: 2025-02-27 | End: 2025-02-28 | Stop reason: HOSPADM

## 2025-02-27 RX ORDER — ALOGLIPTIN 25 MG/1
25 TABLET, FILM COATED ORAL DAILY
Status: DISCONTINUED | OUTPATIENT
Start: 2025-02-27 | End: 2025-02-28 | Stop reason: HOSPADM

## 2025-02-27 RX ORDER — INSULIN GLARGINE 100 [IU]/ML
10 INJECTION, SOLUTION SUBCUTANEOUS 2 TIMES DAILY
Status: DISCONTINUED | OUTPATIENT
Start: 2025-02-27 | End: 2025-02-28

## 2025-02-27 RX ORDER — POTASSIUM CHLORIDE 7.45 MG/ML
10 INJECTION INTRAVENOUS PRN
Status: DISCONTINUED | OUTPATIENT
Start: 2025-02-27 | End: 2025-02-28 | Stop reason: HOSPADM

## 2025-02-27 RX ORDER — POTASSIUM CHLORIDE 1500 MG/1
40 TABLET, EXTENDED RELEASE ORAL PRN
Status: DISCONTINUED | OUTPATIENT
Start: 2025-02-27 | End: 2025-02-28 | Stop reason: HOSPADM

## 2025-02-27 RX ORDER — OSELTAMIVIR PHOSPHATE 75 MG/1
75 CAPSULE ORAL 2 TIMES DAILY
Status: DISCONTINUED | OUTPATIENT
Start: 2025-02-27 | End: 2025-02-28 | Stop reason: HOSPADM

## 2025-02-27 RX ORDER — DEXTROSE MONOHYDRATE 100 MG/ML
INJECTION, SOLUTION INTRAVENOUS CONTINUOUS PRN
Status: DISCONTINUED | OUTPATIENT
Start: 2025-02-27 | End: 2025-02-28 | Stop reason: HOSPADM

## 2025-02-27 RX ORDER — MICONAZOLE NITRATE 2 %
1 CREAM WITH APPLICATOR VAGINAL NIGHTLY
Status: DISCONTINUED | OUTPATIENT
Start: 2025-02-27 | End: 2025-02-27 | Stop reason: RX

## 2025-02-27 RX ORDER — POTASSIUM CHLORIDE 1500 MG/1
40 TABLET, EXTENDED RELEASE ORAL ONCE
Status: COMPLETED | OUTPATIENT
Start: 2025-02-27 | End: 2025-02-27

## 2025-02-27 RX ADMIN — OSELTAMIVIR PHOSPHATE 75 MG: 75 CAPSULE ORAL at 10:13

## 2025-02-27 RX ADMIN — POTASSIUM CHLORIDE 10 MEQ: 7.46 INJECTION, SOLUTION INTRAVENOUS at 03:57

## 2025-02-27 RX ADMIN — INSULIN LISPRO 2 UNITS: 100 INJECTION, SOLUTION INTRAVENOUS; SUBCUTANEOUS at 17:29

## 2025-02-27 RX ADMIN — POTASSIUM CHLORIDE 10 MEQ: 7.46 INJECTION, SOLUTION INTRAVENOUS at 02:34

## 2025-02-27 RX ADMIN — SODIUM CHLORIDE 4.3 UNITS/HR: 9 INJECTION, SOLUTION INTRAVENOUS at 07:20

## 2025-02-27 RX ADMIN — POTASSIUM CHLORIDE 40 MEQ: 1500 TABLET, EXTENDED RELEASE ORAL at 10:14

## 2025-02-27 RX ADMIN — INSULIN GLARGINE 10 UNITS: 100 INJECTION, SOLUTION SUBCUTANEOUS at 10:14

## 2025-02-27 RX ADMIN — OSELTAMIVIR PHOSPHATE 75 MG: 75 CAPSULE ORAL at 20:53

## 2025-02-27 RX ADMIN — ALOGLIPTIN 25 MG: 25 TABLET, FILM COATED ORAL at 10:14

## 2025-02-27 RX ADMIN — POTASSIUM CHLORIDE 40 MEQ: 1500 TABLET, EXTENDED RELEASE ORAL at 05:53

## 2025-02-27 RX ADMIN — INSULIN LISPRO 8 UNITS: 100 INJECTION, SOLUTION INTRAVENOUS; SUBCUTANEOUS at 20:53

## 2025-02-27 RX ADMIN — POTASSIUM CHLORIDE 40 MEQ: 1500 TABLET, EXTENDED RELEASE ORAL at 23:28

## 2025-02-27 RX ADMIN — POTASSIUM CHLORIDE 10 MEQ: 7.46 INJECTION, SOLUTION INTRAVENOUS at 00:06

## 2025-02-27 RX ADMIN — MAGNESIUM SULFATE HEPTAHYDRATE 2000 MG: 40 INJECTION, SOLUTION INTRAVENOUS at 10:09

## 2025-02-27 RX ADMIN — MICONAZOLE NITRATE 200 MG: 200 SUPPOSITORY VAGINAL at 08:39

## 2025-02-27 RX ADMIN — MONTELUKAST 10 MG: 10 TABLET, FILM COATED ORAL at 20:53

## 2025-02-27 RX ADMIN — POTASSIUM CHLORIDE 40 MEQ: 1500 TABLET, EXTENDED RELEASE ORAL at 17:30

## 2025-02-27 RX ADMIN — INSULIN LISPRO 6 UNITS: 100 INJECTION, SOLUTION INTRAVENOUS; SUBCUTANEOUS at 13:00

## 2025-02-27 RX ADMIN — POTASSIUM CHLORIDE 10 MEQ: 7.46 INJECTION, SOLUTION INTRAVENOUS at 04:59

## 2025-02-27 RX ADMIN — INSULIN GLARGINE 10 UNITS: 100 INJECTION, SOLUTION SUBCUTANEOUS at 20:53

## 2025-02-27 RX ADMIN — SODIUM PHOSPHATE, MONOBASIC, MONOHYDRATE AND SODIUM PHOSPHATE, DIBASIC, ANHYDROUS 15 MMOL: 142; 276 INJECTION, SOLUTION INTRAVENOUS at 23:45

## 2025-02-27 RX ADMIN — SODIUM PHOSPHATE, MONOBASIC, MONOHYDRATE AND SODIUM PHOSPHATE, DIBASIC, ANHYDROUS 15 MMOL: 142; 276 INJECTION, SOLUTION INTRAVENOUS at 10:07

## 2025-02-27 ASSESSMENT — PAIN SCALES - GENERAL: PAINLEVEL_OUTOF10: 0

## 2025-02-27 NOTE — CARE COORDINATION
Case Management Assessment  Initial Evaluation    Date/Time of Evaluation: 2/27/2025 2:37 PM  Assessment Completed by: IBIS MCHUGH RN    If patient is discharged prior to next notation, then this note serves as note for discharge by case management.    Patient Name: Rosamaria Henry                   YOB: 1975  Diagnosis: Influenza with respiratory manifestation other than pneumonia [J11.1]  DKA, type 2, not at goal (HCC) [E11.10]  Diabetic ketoacidosis without coma associated with type 2 diabetes mellitus (HCC) [E11.10]                   Date / Time: 2/26/2025 10:26 AM    Patient Admission Status: Inpatient   Readmission Risk (Low < 19, Mod (19-27), High > 27): Readmission Risk Score: 9.5    Current PCP: Anai Ford MD  PCP verified by CM? Yes (Dr. Collazo)    Chart Reviewed: Yes      History Provided by: Patient  Patient Orientation: Alert and Oriented, Person, Place, Situation, Self    Patient Cognition: Alert    Hospitalization in the last 30 days (Readmission):  No    If yes, Readmission Assessment in  Navigator will be completed.    Advance Directives:      Code Status: Full Code   Patient's Primary Decision Maker is: Legal Next of Kin      Discharge Planning:    Patient lives with: Spouse/Significant Other Type of Home: House  Primary Care Giver: Self  Patient Support Systems include: Spouse/Significant Other   Current Financial resources: None  Current community resources: None  Current services prior to admission: Durable Medical Equipment            Current DME: Glucometer            Type of Home Care services:  None    ADLS  Prior functional level: Independent in ADLs/IADLs  Current functional level: Independent in ADLs/IADLs    PT AM-PAC:   /24  OT AM-PAC:   /24    Family can provide assistance at DC: Yes  Would you like Case Management to discuss the discharge plan with any other family members/significant others, and if so, who? Yes (spouse)  Plans to Return to Present Housing:  Yes  Other Identified Issues/Barriers to RETURNING to current housing: n/a  Potential Assistance needed at discharge: N/A            Potential DME:    Patient expects to discharge to: House  Plan for transportation at discharge: Self    Financial    Payor: /     Does insurance require precert for SNF: Yes    Potential assistance Purchasing Medications: No  Meds-to-Beds request: Yes      [facility] - [place], [place] - [address] - P [phone] - F [phone]  [address]  [facility] [address]  Phone: [phone] Fax: [phone]      Notes:    Factors facilitating achievement of predicted outcomes: Family support, Cooperative, and Pleasant    Barriers to discharge: Medication managment    Additional Case Management Notes: Influenza A, DKA. New to diabetes. See's Dr/ Zay who manages her insulin. Has glucometer and supplies. Already doing med mngmt o/p. Lives with spouse. Plan is home independently. Probable tomorrow. Declines any skilled needs.     The Plan for Transition of Care is related to the following treatment goals of Influenza with respiratory manifestation other than pneumonia [J11.1]  DKA, type 2, not at goal (HCC) [E11.10]  Diabetic ketoacidosis without coma associated with type 2 diabetes mellitus (HCC) [E11.10]    IF APPLICABLE: The Patient and/or patient representative Rosamaria and her family were provided with a choice of provider and agrees with the discharge plan. Freedom of choice list with basic dialogue that supports the patient's individualized plan of care/goals and shares the quality data associated with the providers was provided to: Patient   Patient Representative Name:       The Patient and/or Patient Representative Agree with the Discharge Plan? Yes    IBIS MCHUGH RN  Case Management Department

## 2025-02-27 NOTE — PROGRESS NOTES
[facility]  Office: [phone]  Joon Linton DO, Neto Lainez DO, Yaakov Lawrence DO, Pete Helm DO, Deo Buchanan MD, Sol Ochoa MD, Hardik Blair MD, Luzmaria Olivarez MD,  Rubens Vann MD, Elena Wilkes MD, Cydney Montes MD,  Vanesa Sharma DO, Gee Banegas MD, Byron Adam MD, Lobito Linton DO, Lilliana Main MD,  Nacho Lopez DO, Sophy Veras MD, Ina Garcia MD, Michaela Winston MD, Tiffanie Lang MD,  Victor Manuel Pitts MD, Emile rC MD, Robin Sorenson MD, Ayesha Modi MD, Shaun Perera MD, Brittney Shaver MD, Reginald Siddiqui DO, Chase Aguayo MD, Vanesa Mariscal MD, Mohsin Reza, MD, Shirley Waterhouse, CNP,  Yanni Hanna CNP, Reginald Del Castillo, CNP,  Lola Kaur, DNP, Suha Pressley, CNP, Sheeba Linares, CNP, Ethel Moreno, CNP, Fátima Workman CNP, Alexandria Callaway, PA-C, Nelida Riley, CNP, Massimo Brown, CNP,  Lani Bob, CNP, Chica Elmore, CNP, Sunshine Tripp, CNP,  Darlyn Undewrood, CNS, Kylah Lynn CNP, Kathryn Gamboa CNP,   Lorena Nicolas, CNP         [facility]   IN-PATIENT SERVICE   [facility]    Progress Note    2/27/2025    3:22 PM    Name:   Rosamaria Henry  MRN:     [id number]     Acct:      [id number]   Room:   2035/2035-01  IP Day:  1  Admit Date:  2/26/2025 10:26 AM    PCP:   Anai Ford MD  Code Status:  Full Code    Subjective:     C/C:   Chief Complaint   Patient presents with    Hyperglycemia     Pt's meter shows >400, states she takes her insulin as prescribed    Headache     Interval History Status: .   Feeling better today, denies nausea  Blood sugars improved to 299 with insulin drip, A1c was 15.3  CO2 improved to 22, anion gap 14  AST and ALT are also mildly increased with normal bilirubin  Brief History:     Rosamaria Henry is a 49 y.o. Non- / non  female who presents with Hyperglycemia (Pt's meter shows >400, states she takes her insulin as prescribed) and Headache   and is admitted

## 2025-02-27 NOTE — PLAN OF CARE
Problem: Discharge Planning  Goal: Discharge to home or other facility with appropriate resources  2/27/2025 1803 by Mere Hinkle, RN  Outcome: Progressing  2/27/2025 1803 by Mere Hinkle, RN  Outcome: Progressing     Problem: Safety - Adult  Goal: Free from fall injury  2/27/2025 1803 by Mere Hinkle, RN  Outcome: Progressing  2/27/2025 1803 by Mere Hinkle, RN  Outcome: Progressing     Problem: Metabolic/Fluid and Electrolytes - Adult  Goal: Electrolytes maintained within normal limits  Outcome: Progressing  Goal: Glucose maintained within prescribed range  Outcome: Progressing

## 2025-02-27 NOTE — CARE COORDINATION
Rosamaria Henry  [id number]  meets criteria for hypertension education. The following resources and interventions were provided: Diabetes Flyer education and Hypertension Flyer education

## 2025-02-28 VITALS
TEMPERATURE: 98.1 F | HEIGHT: 64 IN | HEART RATE: 88 BPM | WEIGHT: 245.6 LBS | DIASTOLIC BLOOD PRESSURE: 74 MMHG | BODY MASS INDEX: 41.93 KG/M2 | RESPIRATION RATE: 16 BRPM | SYSTOLIC BLOOD PRESSURE: 116 MMHG | OXYGEN SATURATION: 100 %

## 2025-02-28 LAB
ANION GAP SERPL CALCULATED.3IONS-SCNC: 12 MMOL/L (ref 9–16)
BUN SERPL-MCNC: 4 MG/DL (ref 6–20)
CALCIUM SERPL-MCNC: 7.9 MG/DL (ref 8.6–10.4)
CHLORIDE SERPL-SCNC: 106 MMOL/L (ref 98–107)
CO2 SERPL-SCNC: 21 MMOL/L (ref 20–31)
CREAT SERPL-MCNC: 0.7 MG/DL (ref 0.5–0.9)
GFR, ESTIMATED: >90 ML/MIN/1.73M2
GLUCOSE BLD-MCNC: 253 MG/DL (ref 65–105)
GLUCOSE BLD-MCNC: 340 MG/DL (ref 65–105)
GLUCOSE SERPL-MCNC: 290 MG/DL (ref 74–99)
MAGNESIUM SERPL-MCNC: 1.9 MG/DL (ref 1.6–2.6)
PHOSPHATE SERPL-MCNC: 2.9 MG/DL (ref 2.5–4.5)
POTASSIUM SERPL-SCNC: 3.4 MMOL/L (ref 3.7–5.3)
SODIUM SERPL-SCNC: 139 MMOL/L (ref 136–145)

## 2025-02-28 PROCEDURE — 6370000000 HC RX 637 (ALT 250 FOR IP): Performed by: INTERNAL MEDICINE

## 2025-02-28 PROCEDURE — 82947 ASSAY GLUCOSE BLOOD QUANT: CPT

## 2025-02-28 PROCEDURE — 80048 BASIC METABOLIC PNL TOTAL CA: CPT

## 2025-02-28 PROCEDURE — 36415 COLL VENOUS BLD VENIPUNCTURE: CPT

## 2025-02-28 PROCEDURE — 83735 ASSAY OF MAGNESIUM: CPT

## 2025-02-28 PROCEDURE — 6370000000 HC RX 637 (ALT 250 FOR IP): Performed by: NURSE PRACTITIONER

## 2025-02-28 PROCEDURE — 99232 SBSQ HOSP IP/OBS MODERATE 35: CPT | Performed by: INTERNAL MEDICINE

## 2025-02-28 PROCEDURE — 84100 ASSAY OF PHOSPHORUS: CPT

## 2025-02-28 RX ORDER — INSULIN GLARGINE 100 [IU]/ML
15 INJECTION, SOLUTION SUBCUTANEOUS 2 TIMES DAILY
Status: DISCONTINUED | OUTPATIENT
Start: 2025-02-28 | End: 2025-02-28 | Stop reason: HOSPADM

## 2025-02-28 RX ORDER — INSULIN LISPRO 100 [IU]/ML
2 INJECTION, SOLUTION INTRAVENOUS; SUBCUTANEOUS
Qty: 5 ADJUSTABLE DOSE PRE-FILLED PEN SYRINGE | Refills: 3 | Status: SHIPPED | OUTPATIENT
Start: 2025-02-28

## 2025-02-28 RX ORDER — LIDOCAINE 40 MG/G
CREAM TOPICAL PRN
Status: DISCONTINUED | OUTPATIENT
Start: 2025-02-28 | End: 2025-02-28 | Stop reason: HOSPADM

## 2025-02-28 RX ORDER — INSULIN GLARGINE 100 [IU]/ML
15 INJECTION, SOLUTION SUBCUTANEOUS 2 TIMES DAILY
Qty: 5 ADJUSTABLE DOSE PRE-FILLED PEN SYRINGE | Refills: 3 | Status: SHIPPED | OUTPATIENT
Start: 2025-02-28

## 2025-02-28 RX ORDER — OSELTAMIVIR PHOSPHATE 75 MG/1
75 CAPSULE ORAL 2 TIMES DAILY
Qty: 7 CAPSULE | Refills: 0 | Status: SHIPPED | OUTPATIENT
Start: 2025-02-28 | End: 2025-03-04

## 2025-02-28 RX ORDER — MICONAZOLE NITRATE 20 MG/G
CREAM TOPICAL
Qty: 92 G | Refills: 1 | Status: SHIPPED | OUTPATIENT
Start: 2025-02-28

## 2025-02-28 RX ORDER — MICONAZOLE NITRATE 20 MG/G
CREAM TOPICAL 2 TIMES DAILY
Status: DISCONTINUED | OUTPATIENT
Start: 2025-02-28 | End: 2025-02-28 | Stop reason: HOSPADM

## 2025-02-28 RX ORDER — ALOGLIPTIN 25 MG/1
25 TABLET, FILM COATED ORAL DAILY
Qty: 30 TABLET | Refills: 1 | Status: SHIPPED | OUTPATIENT
Start: 2025-03-01

## 2025-02-28 RX ADMIN — MICONAZOLE NITRATE: 20 CREAM TOPICAL at 11:55

## 2025-02-28 RX ADMIN — ALOGLIPTIN 25 MG: 25 TABLET, FILM COATED ORAL at 10:00

## 2025-02-28 RX ADMIN — POTASSIUM CHLORIDE 40 MEQ: 1500 TABLET, EXTENDED RELEASE ORAL at 10:00

## 2025-02-28 RX ADMIN — OSELTAMIVIR PHOSPHATE 75 MG: 75 CAPSULE ORAL at 09:59

## 2025-02-28 RX ADMIN — INSULIN GLARGINE 15 UNITS: 100 INJECTION, SOLUTION SUBCUTANEOUS at 10:02

## 2025-02-28 RX ADMIN — INSULIN LISPRO 6 UNITS: 100 INJECTION, SOLUTION INTRAVENOUS; SUBCUTANEOUS at 11:56

## 2025-02-28 RX ADMIN — INSULIN LISPRO 4 UNITS: 100 INJECTION, SOLUTION INTRAVENOUS; SUBCUTANEOUS at 10:02

## 2025-02-28 NOTE — PLAN OF CARE
[facility]  Office: [phone]  Joon Linton DO, Neto Lainez DO, Yaakov aLwrence DO, Pete Helm DO, Deo Buchanan MD, Sol Ochoa MD, Hardik Blair MD, Luzmaria Olivarez MD,  Rubens Vann MD, Elena Wilkes MD, Cydney Montes MD,  Vanesa Sharma DO, Gee Banegas MD, Byron Adam MD, Lobito Linton DO, Lilliana Main MD,  Nacho Lopez DO, Sophy Veras MD, Ina Garcia MD, Michaela Winston MD, Tiffanie Lang MD,  Victor Manuel Pitts MD, Emile Cr MD, Robin Sorenson MD, Ayesha Modi MD, Shaun Perera MD, Brittney Shaver MD, Reginald Siddiqui DO, Chase Aguayo MD, Vanesa Mariscal MD, Mohsin Reza, MD, Shirley Waterhouse, CNP,  Yanni Hanna CNP, Reginald Del Castillo, CNP,  Lola Kaur, DNP, Suha Pressley, CNP, Sheeba Linares, CNP, Ethel Moreno, CNP, Fátima Workman, CNP, Alexandria Callaway PA-C, Nelida Riley, CNP, Massimo Brown, CNP,  Lani Bob, CNP, Chica Elmore, CNP, Sunshine Tripp, CNP,  Darlyn Underwood, CNS, Kylah Lynn CNP, Kathryn Gamboa, CNP,   Lorena Nicolas, CNP         [facility]   IN-PATIENT SERVICE   [facility]        2/28/2025    11:56 AM    Name:   Rosamaria Henry  MRN:     [id number]     Acct:      [id number]   Room:   2040/2040-01  IP Day:  2  Admit Date:  2/26/2025 10:26 AM    PCP:   Anai Ford MD  Code Status:  Full Code    Low intensity insulin sliding scale    Humalog insulin  3xday with meals    **Corrective Low Dose Algorithm**  Glucose: Dose:               No Insulin  140-199 1 Unit  200-249 2 Units  250-299 3 Units  300-349 4 Units  350-399 5 Units  Over 399 6 Units    Humalog insulin  Nightly / bedtime    **Corrective Bedtime (50%) Low Dose Algorithm**  Glucose: Dose:               No Insulin  140-249 1 Unit  250-349 2 Units  Over 350 3 Units         Deo Buchanan MD  2/28/2025  11:56 AM

## 2025-02-28 NOTE — DISCHARGE INSTRUCTIONS
Low intensity insulin sliding scale     Humalog insulin  3xday with meals     **Corrective Low Dose Algorithm**  Glucose:Dose:               No Insulin  140-1991 Unit  200-2492 Units  250-2993 Units  300-3494 Units  350-3995 Units  Over 3996 Units     Humalog insulin  Nightly / bedtime     **Corrective Bedtime (50%) Low Dose Algorithm**  Glucose:Dose:               No Insulin  140-2491 Unit  250-3492 Units  Over 3503 Units

## 2025-02-28 NOTE — PROGRESS NOTES
[facility]  Office: [phone]  Joon Linton DO, Neto Lainez DO, Yaakov Lawrence DO, Pete Helm DO, Deo Buchanan MD, Sol Ochoa MD, Hardik Blair MD, Luzmaria Olivarez MD,  Rubens Vann MD, Elena Wilkes MD, Cydney Montes MD,  Vanesa Sharma DO, Gee Banegas MD, Byron Adam MD, Lobito Linton DO, Lilliana Main MD,  Nacho Lopez DO, Sophy Veras MD, Ina Garcia MD, Michaela Winston MD, Tiffanie Lang MD,  Victor Manuel Pitts MD, Emile Cr MD, Robin Sorenson MD, Ayesha Modi MD, Shaun Perera MD, Brittney Shaver MD, Reginald Siddiqui DO, Chase Aguayo MD, Vanesa Mariscal MD, Mohsin Reza, MD, Shirley Waterhouse, CNP,  Yanni Hanna CNP, Reginald Del Castillo, CNP,  Lola Kaur, DNP, Suha Pressley, CNP, Sheeba Linares, CNP, Ethel Moreno, CNP, Fátima Workman CNP, Alexandria Callaway, PA-C, Nelida Riley, CNP, Massimo Brown, CNP,  Lani Bob, CNP, Chica Elmore, CNP, Sunshine Tripp, CNP,  Darlyn Underwood, CNS, Kylah Lynn CNP, Kathryn Gamboa CNP,   Lorena Nicolas, CNP         [facility]   IN-PATIENT SERVICE   [facility]    Progress Note    2/28/2025    11:53 AM    Name:   Rosamaria Henry  MRN:     [id number]     Acct:      [id number]   Room:   2040/2040-01  IP Day:  2  Admit Date:  2/26/2025 10:26 AM    PCP:   Anai Ford MD  Code Status:  Full Code    Subjective:     C/C:   Chief Complaint   Patient presents with    Hyperglycemia     Pt's meter shows >400, states she takes her insulin as prescribed    Headache     Interval History Status: .   Feeling better today, denies nausea or vomiting  Blood sugars improved to 290-303, A1c was 15.3  CO2 improved to 21, anion gap 12  AST and ALT are also mildly better with normal bilirubin  Brief History:     Rosamaria Henry is a 49 y.o. Non- / non  female who presents with Hyperglycemia (Pt's meter shows >400, states she takes her insulin as prescribed) and Headache   and is admitted to  AC & HS    sodium chloride  80 mL IntraVENous Once    enoxaparin  30 mg SubCUTAneous BID    montelukast  10 mg Oral Nightly    LORazepam  0.5 mg IntraVENous Once     Continuous Infusions:    dextrose      dextrose 5 % and 0.45 % NaCl 150 mL/hr at 25 2339     PRN Meds: lidocaine, potassium chloride **OR** potassium alternative oral replacement **OR** potassium chloride, glucose, dextrose bolus **OR** dextrose bolus, glucagon (rDNA), dextrose, dextrose bolus **OR** dextrose bolus, potassium chloride, magnesium sulfate, sodium phosphate 15 mmol in sodium chloride 0.9 % 250 mL IVPB, polyethylene glycol, dextrose 5 % and 0.45 % NaCl, albuterol sulfate HFA    Data:     Past Medical History:   has a past medical history of Diabetes mellitus (HCC) and Hypertension.    Social History:   reports that she has been smoking cigarettes. She has never used smokeless tobacco. She reports that she does not currently use alcohol. She reports that she does not use drugs.     Family History: History reviewed. No pertinent family history.    Vitals:  /74   Pulse 88   Temp 98.1 °F (36.7 °C) (Oral)   Resp 16   Ht 1.626 m (5' 4\")   Wt 111.4 kg (245 lb 9.6 oz)   LMP 2025 (Approximate)   SpO2 100%   BMI 42.16 kg/m²   Temp (24hrs), Av.1 °F (36.7 °C), Min:97.3 °F (36.3 °C), Max:98.7 °F (37.1 °C)    Recent Labs     25  1611 25  0734 25  1123   POCGLU 180* 303* 253* 340*       I/O (24Hr):  No intake or output data in the 24 hours ending 25 1153    Labs:  Hematology:  Recent Labs     25  1150   WBC 9.7   RBC 5.30*   HGB 14.8   HCT 44.4   MCV 83.8   MCH 27.9   MCHC 33.3   RDW 13.8      MPV 14.2*     Chemistry:  Recent Labs     25  1133 25  1502 25  0324    138 139   K 3.4* 3.4* 3.4*    102 106   CO2 22 23 21   GLUCOSE 299* 224* 290*   BUN 4* 4* 4*   CREATININE 0.7 0.8 0.7   MG 2.1 2.2 1.9   ANIONGAP 14 14 12   LABGLOM >90 >90 >90

## 2025-02-28 NOTE — DISCHARGE SUMMARY
[facility]  Office: [phone]  Joon Linton DO, Neto Lainez DO, Yaakov Lawrence DO, Pete Helm DO, Deo Buchanan MD, Sol Ochoa MD, Hardik Blair MD, Luzmaria Olivarez MD,  Rubens Vann MD, Elena Wilkes MD, Cydney Montes MD,  Vanesa Sharma DO, Gee Banegas MD, Byron Adam MD, Lobito Linton DO, Lilliana Main MD,  Nacho Lopez DO, Sophy Veras MD, Ina Garcia MD, Michaela Winston MD, Tiffanie Lang MD,  Victor Manuel Pitts MD, Emile Cr MD, Robin Sorenson MD, Ayesha Modi MD, Shaun Perera MD, Brittney Shaver MD, Reginald Siddiqui DO, Chase Aguayo MD, Vanesa Mariscal MD, Mohsin Reza, MD, Shirley Waterhouse, CNP,  Yanni Hanna CNP, Reginald Del Castillo, CNP,  Lola Kaur, DNP, Suha Pressley, CNP, Sheeba Linares, CNP, Ethel Moreno, CNP, Fátima Workman, CNP, Alexandria Callaway PA-C, Nelida Riley, CNP, aMssimo Bronw, CNP,  Lani Bob, CNP, Chica Elmore, CNP, Sunshine Tripp, CNP,  Darlyn Underwood, CNS, Kylah Lynn CNP, Kathryn Gamboa CNP,   Lorena Nicolas, CNP         [facility]   IN-PATIENT SERVICE   [facility] [place]    Discharge Summary     Patient ID: Rosamaria Henry  :  1975   MRN: [id number]     ACCOUNT:  [id number]   Patient's PCP: Anai Ford MD  Admit Date: 2025   Discharge Date: 2025     Length of Stay: 2  Code Status:  Prior  Admitting Physician: Deo Buchanan MD  Discharge Physician: Deo Buchanan MD     Active Discharge Diagnoses:     Hospital Problem Lists:  Principal Problem:    DKA, type 2, not at goal (HCC)  Active Problems:    Transaminitis    Influenza A    Allergic rhinitis    Primary hypertension  Resolved Problems:    * No resolved hospital problems. *    Smoker    Admission Condition:  poor     Discharged Condition: stable    Hospital Stay:   Admitting history:  Rosamaria Henry is a 49 y.o. Non- / non  female who presents with Hyperglycemia (Pt's meter shows >400, states she  daily  What changed: when to take this            CONTINUE taking these medications      * albuterol sulfate  (90 Base) MCG/ACT inhaler  Commonly known as: Ventolin HFA  Inhale 2 puffs into the lungs 4 times daily as needed for Wheezing     * albuterol (2.5 MG/3ML) 0.083% nebulizer solution  Commonly known as: PROVENTIL  Take 3 mLs by nebulization every 6 hours as needed for Wheezing     amLODIPine 5 MG tablet  Commonly known as: NORVASC     COLLAGEN PO     Flonase Allergy Relief 50 MCG/ACT nasal spray  Generic drug: fluticasone     hydroCHLOROthiazide 25 MG tablet  Commonly known as: HYDRODIURIL     montelukast 10 MG tablet  Commonly known as: SINGULAIR     Nebulizer System All-In-One Misc  1 Units by Does not apply route 4 times daily as needed (dyspnea.  cough)     Symbicort 80-4.5 MCG/ACT Aero  Generic drug: budesonide-formoterol           * This list has 2 medication(s) that are the same as other medications prescribed for you. Read the directions carefully, and ask your doctor or other care provider to review them with you.                STOP taking these medications      ibuprofen 800 MG tablet  Commonly known as: ADVIL;MOTRIN     metFORMIN 500 MG tablet  Commonly known as: GLUCOPHAGE               Where to Get Your Medications        These medications were sent to [facility] [address] [place] [address] -  [phone] - F [phone]  [address] [place] [id number]      Phone: [phone]   alogliptin 25 MG Tabs tablet  insulin lispro (1 Unit Dial) 100 UNIT/ML Sopn  Lantus SoloStar 100 UNIT/ML injection pen  miconazole 2 % cream  miconazole 200 MG vaginal suppository  oseltamivir 75 MG capsule         Discharge Procedure Orders   Comprehensive Metabolic Panel   Standing Status: Future Standing Exp. Date: 03/28/25   Order Comments: Send results to PCP       Time Spent on discharge is  35 mins in patient examination, evaluation, counseling as well as

## 2025-02-28 NOTE — PLAN OF CARE
Problem: Discharge Planning  Goal: Discharge to home or other facility with appropriate resources  2/27/2025 1956 by Opal Garland, RN  Outcome: Progressing  2/27/2025 1803 by Mere Hinkle, RN  Outcome: Progressing  2/27/2025 1803 by Mere Hinkle, RN  Outcome: Progressing     Problem: ABCDS Injury Assessment  Goal: Absence of physical injury  Outcome: Progressing

## 2025-02-28 NOTE — PLAN OF CARE
Problem: Discharge Planning  Goal: Discharge to home or other facility with appropriate resources  2/28/2025 1331 by Belén Fields RN  Outcome: Adequate for Discharge  2/28/2025 1330 by Belén Fields RN  Outcome: Progressing     Problem: ABCDS Injury Assessment  Goal: Absence of physical injury  2/28/2025 1331 by Belén Fields RN  Outcome: Adequate for Discharge  2/28/2025 1330 by Belén Fields RN  Outcome: Progressing     Problem: Skin/Tissue Integrity  Goal: Skin integrity remains intact  Description: 1.  Monitor for areas of redness and/or skin breakdown  2.  Assess vascular access sites hourly  3.  Every 4-6 hours minimum:  Change oxygen saturation probe site  4.  Every 4-6 hours:  If on nasal continuous positive airway pressure, respiratory therapy assess nares and determine need for appliance change or resting period  2/28/2025 1331 by Belén Fields RN  Outcome: Adequate for Discharge  2/28/2025 1330 by Belén Fields RN  Outcome: Progressing     Problem: Safety - Adult  Goal: Free from fall injury  2/28/2025 1331 by Belén Fields RN  Outcome: Adequate for Discharge  2/28/2025 1330 by Belén Fields RN  Outcome: Progressing     Problem: Metabolic/Fluid and Electrolytes - Adult  Goal: Electrolytes maintained within normal limits  2/28/2025 1331 by Belén Fields RN  Outcome: Adequate for Discharge  2/28/2025 1330 by Belén Fields RN  Outcome: Progressing  Goal: Glucose maintained within prescribed range  2/28/2025 1331 by Belén Fields RN  Outcome: Adequate for Discharge  2/28/2025 1330 by Belén Fields RN  Outcome: Progressing

## 2025-02-28 NOTE — FLOWSHEET NOTE
02/28/25 1325   Discharge Event   Lay Caregiver notified of pending discharge/transfer and educated on post discharge care Yes   Discharged with Documented Belongings Yes   Departure Mode With spouse   Mobility at Departure Ambulatory   Discharged to Private Residence   Time of Discharge 1320

## 2025-02-28 NOTE — PROGRESS NOTES
Discharge Note:      All discharge instructions given at this time as well as all patient belongings returned to patient. Pt denies any further questions regarding discharge at this time. Pt given discharge packet with unit letter, discharge instructions/restrictions and medication handouts regarding all discharge medications and side effects. Pt denies any further issues at this time.    Pt walked out to front discharge doors at this time.     Pt left premises without any issues in private vehicle at this time.

## 2025-03-04 NOTE — PROGRESS NOTES
Physician Progress Note      PATIENT:               NIKOLAS RASHID  CSN #:                  [id number]  :                       1975  ADMIT DATE:       2025 10:26 AM  DISCH DATE:        2025 1:40 PM  RESPONDING  PROVIDER #:        Deo Buchanan MD          QUERY TEXT:    Patient admitted with BMI 42.16. If possible, please document in progress   notes and discharge summary if you are evaluating and /or treating any of the   following:    The medical record reflects the following:  Risk Factors: DM 2  Clinical Indicators:- Ht.=162.6 cm, Wt.=111.4 kg, calculated BMI=42.16; MD   notes  \"alert, cooperative and no distress, obese\"    Treatment: CMP, BMI calculation    Specificity of obesity and morbid obesity should be reported based on   physician documentation, as there are several published classifications and   definitions?  MS-DRG Training Guide. CDC:   https://www.cdc.gov/obesity/basics/adult-defining.html. WHO:   https://www.who.int/news-room/fact-sheets/detail/obesity-and-overweight. NIH:   https://www.nhlbi.nih.gov/health/educational/lose_wt/BMI/bmi_dis.htm  Options provided:  -- Morbid obesity  -- Other - I will add my own diagnosis  -- Disagree - Not applicable / Not valid  -- Disagree - Clinically unable to determine / Unknown  -- Refer to Clinical Documentation Reviewer    PROVIDER RESPONSE TEXT:    This patient has morbid obesity.    Query created by: Arleth Phipps on 3/3/2025 7:50 AM      Electronically signed by:  Deo uBchanan MD 3/4/2025 3:16 PM